# Patient Record
Sex: MALE | URBAN - METROPOLITAN AREA
[De-identification: names, ages, dates, MRNs, and addresses within clinical notes are randomized per-mention and may not be internally consistent; named-entity substitution may affect disease eponyms.]

---

## 2021-09-23 ENCOUNTER — NEW PATIENT (OUTPATIENT)
Dept: URBAN - METROPOLITAN AREA CLINIC 14 | Facility: CLINIC | Age: 71
End: 2021-09-23

## 2021-09-23 DIAGNOSIS — H43.813: ICD-10-CM

## 2021-09-23 PROCEDURE — 99203 OFFICE O/P NEW LOW 30 MIN: CPT

## 2021-09-23 PROCEDURE — 92134 CPTRZ OPH DX IMG PST SGM RTA: CPT

## 2021-09-23 PROCEDURE — 92202 OPSCPY EXTND ON/MAC DRAW: CPT

## 2021-09-23 ASSESSMENT — VISUAL ACUITY
OD_SC: 20/25
OS_SC: 20/20

## 2021-09-23 ASSESSMENT — TONOMETRY
OS_IOP_MMHG: 15
OD_IOP_MMHG: 13

## 2022-10-26 ENCOUNTER — HOSPITAL ENCOUNTER (EMERGENCY)
Facility: HOSPITAL | Age: 72
Discharge: HOME | End: 2022-10-26
Attending: EMERGENCY MEDICINE
Payer: COMMERCIAL

## 2022-10-26 VITALS
BODY MASS INDEX: 23.03 KG/M2 | HEART RATE: 58 BPM | RESPIRATION RATE: 14 BRPM | DIASTOLIC BLOOD PRESSURE: 64 MMHG | TEMPERATURE: 97.7 F | SYSTOLIC BLOOD PRESSURE: 130 MMHG | OXYGEN SATURATION: 99 % | HEIGHT: 72 IN | WEIGHT: 170 LBS

## 2022-10-26 DIAGNOSIS — I49.3 PVC (PREMATURE VENTRICULAR CONTRACTION): Primary | ICD-10-CM

## 2022-10-26 LAB
ANION GAP SERPL CALCULATED.3IONS-SCNC: 3 MMOL/L (ref 4–13)
ATRIAL RATE: 55 BPM
BUN SERPL-MCNC: 22 MG/DL (ref 5–25)
CALCIUM SERPL-MCNC: 10.2 MG/DL (ref 8.3–10.1)
CHLORIDE SERPL-SCNC: 109 MMOL/L (ref 96–108)
CO2 SERPL-SCNC: 29 MMOL/L (ref 21–32)
CREAT SERPL-MCNC: 1.04 MG/DL (ref 0.6–1.3)
GFR SERPL CREATININE-BSD FRML MDRD: 70 ML/MIN/1.73SQ M
GLUCOSE SERPL-MCNC: 99 MG/DL (ref 65–140)
P AXIS: 95 DEGREES
POTASSIUM SERPL-SCNC: 4.3 MMOL/L (ref 3.5–5.3)
PR INTERVAL: 130 MS
QRS AXIS: 28 DEGREES
QRSD INTERVAL: 102 MS
QT INTERVAL: 436 MS
QTC INTERVAL: 417 MS
SODIUM SERPL-SCNC: 141 MMOL/L (ref 135–147)
T WAVE AXIS: 60 DEGREES
VENTRICULAR RATE: 55 BPM

## 2022-10-26 PROCEDURE — 93005 ELECTROCARDIOGRAM TRACING: CPT

## 2022-10-26 PROCEDURE — 36415 COLL VENOUS BLD VENIPUNCTURE: CPT

## 2022-10-26 PROCEDURE — 93010 ELECTROCARDIOGRAM REPORT: CPT | Performed by: INTERNAL MEDICINE

## 2022-10-26 PROCEDURE — 80048 BASIC METABOLIC PNL TOTAL CA: CPT

## 2022-10-26 NOTE — ED ATTENDING ATTESTATION
10/26/2022  Henry HORNER DO, saw and evaluated the patient  I have discussed the patient with the resident/non-physician practitioner and agree with the resident's/non-physician practitioner's findings, Plan of Care, and MDM as documented in the resident's/non-physician practitioner's note, except where noted  All available labs and Radiology studies were reviewed  I was present for key portions of any procedure(s) performed by the resident/non-physician practitioner and I was immediately available to provide assistance  At this point I agree with the current assessment done in the Emergency Department  I have conducted an independent evaluation of this patient a history and physical is as follows:    Patient is a 65-year-old male history of previous PVCs diagnosed by Holter monitor, says about 11:00 a m  This morning he was driving in the car with his wife when he noticed feeling of palpitations which felt like his prior PVCs  Felt a little bit lightheaded with each PVC, no chest pain, no diaphoresis, no nausea, no vomiting  He stopped driving, took 1 of his wife's metoprolol tablets as he did not have his metoprolol with him, he does not know the exact dose but the wife believes it was the lowest dose, thinks it was 25 mg  Says after about 3 minutes of palpitations totally resolved and he now feels totally fine  Normally he has no exertional symptoms, no fever or chills  No orthopnea  Patient denies any prolonged travel history, no recent long travel, no immobilizations, or hospitalizations  Patient has an appoint with his cardiologist on Friday, 2 days from now      General:  Patient is well-appearing  Head:  Atraumatic  Eyes:  Conjunctiva pink  ENT:  Mucous membranes are moist  Neck:  Supple  Cardiac:  S1-S2, without murmurs  Lungs:  Clear to auscultation bilaterally  Abdomen:  Soft, nontender, normal bowel sounds, no CVA tenderness, no tympany, no rigidity, no guarding  Extremities:  Normal range of motion, no pedal edema or calf asymmetry, radial pulses are equal and symmetric bilaterally  Neurologic:  Awake, fluent speech, normal comprehension, AAOx3  Skin:  Pink warm and dry  Psychiatric:  Alert, pleasant, cooperative      ED Course     ECG interpreted me, sinus rhythm, rate of 55, no ST segment elevation or depression, no ischemic or infarct changes, no old available for comparison    Labs Reviewed   BASIC METABOLIC PANEL - Abnormal       Result Value Ref Range Status    Sodium 141  135 - 147 mmol/L Final    Potassium 4 3  3 5 - 5 3 mmol/L Final    Comment: Slightly Hemolyzed; Results May be Affected    Chloride 109 (*) 96 - 108 mmol/L Final    CO2 29  21 - 32 mmol/L Final    ANION GAP 3 (*) 4 - 13 mmol/L Final    BUN 22  5 - 25 mg/dL Final    Creatinine 1 04  0 60 - 1 30 mg/dL Final    Comment: Standardized to IDMS reference method    Glucose 99  65 - 140 mg/dL Final    Comment: If the patient is fasting, the ADA then defines impaired fasting glucose as > 100 mg/dL and diabetes as > or equal to 123 mg/dL  Specimen collection should occur prior to Sulfasalazine administration due to the potential for falsely depressed results  Specimen collection should occur prior to Sulfapyridine administration due to the potential for falsely elevated results      Calcium 10 2 (*) 8 3 - 10 1 mg/dL Final    eGFR 70  ml/min/1 73sq m Final    Narrative:     [place] guidelines for Chronic Kidney Disease (CKD):   •  Stage 1 with normal or high GFR (GFR > 90 mL/min/1 73 square meters)  •  Stage 2 Mild CKD (GFR = 60-89 mL/min/1 73 square meters)  •  Stage 3A Moderate CKD (GFR = 45-59 mL/min/1 73 square meters)  •  Stage 3B Moderate CKD (GFR = 30-44 mL/min/1 73 square meters)  •  Stage 4 Severe CKD (GFR = 15-29 mL/min/1 73 square meters)  •  Stage 5 End Stage CKD (GFR <15 mL/min/1 73 square meters)  Note: GFR calculation is accurate only with a steady state creatinine         On reassessment patient remains asymptomatic  At this point the patient most likely had isolated PVCs  Possibly could have been other sort of dysrhythmia however he is currently asymptomatic, history does not suggest ACS  Believe patient is stable for discharge and outpatient follow-up in 2 days with Cardiology as previously scheduled  Supportive care, importance of follow-up and return precautions were discussed with patient and wife, who expressed understanding        Critical Care Time  Procedures

## 2022-10-26 NOTE — ED PROVIDER NOTES
History  Chief Complaint   Patient presents with   • Palpitations     Pt has hx of PVCs, and started feeling palpitations in his chest while driving which he pulled over and took his wifes metoprolol ( unsure of dosage)      Patient 78-year-old male presenting for PVCs  Past medical history significant for Parkinson's and previous diagnosis of PVCs  Patient states he was driving to the [employer] approximately 30-40 minutes ago when he had a 2 minutes episode of palpitations  Patient felt dizzy/lightheaded during this episode and took his wife's metoprolol to treat the palpitations  At the time of the episode patient denies any chest pain, radiating pain down his arm or shortness of breath (any other symptoms)  Symptoms have since resolved  States he believes medication probably help  However due to concern still presented to the ER  At this time patient is entirely asymptomatic  Patient denies any chest pain, shortness of breath, nausea/vomiting  Patient denies any previous history of MI or heart surgery  None       Past Medical History:   Diagnosis Date   • Parkinson's disease ([facility] [address] )     mild   • PVC's (premature ventricular contractions)        History reviewed  No pertinent surgical history  History reviewed  No pertinent family history  I have reviewed and agree with the history as documented  E-Cigarette/Vaping     E-Cigarette/Vaping Substances     Social History     Tobacco Use   • Smoking status: Former Smoker     Quit date: 12/15/2000     Years since quittin 8   • Smokeless tobacco: Former User   Substance Use Topics   • Alcohol use: Not Currently     Alcohol/week: 1 0 standard drink     Types: 1 Cans of beer per week        Review of Systems   Constitutional: Negative  HENT: Negative  Eyes: Negative  Respiratory: Negative  Cardiovascular: Negative  Negative for chest pain and palpitations  At time a episode patient had palpitations    Currently patient has no palpitations  Gastrointestinal: Negative  Endocrine: Negative  Genitourinary: Negative  Musculoskeletal: Negative  Skin: Negative  Allergic/Immunologic: Negative  Neurological: Negative  Negative for dizziness and light-headedness  Patient currently asymptomatic  During episode patient states he was felt lightheaded, dizzy  Hematological: Negative  Psychiatric/Behavioral: Negative  Physical Exam  ED Triage Vitals [10/26/22 1229]   Temperature Pulse Respirations Blood Pressure SpO2   97 7 °F (36 5 °C) 57 16 (!) 174/77 98 %      Temp Source Heart Rate Source Patient Position - Orthostatic VS BP Location FiO2 (%)   Oral Monitor Lying Right arm --      Pain Score       No Pain             Orthostatic Vital Signs  Vitals:    10/26/22 1229 10/26/22 1315   BP: (!) 174/77 130/64   Pulse: 57 58   Patient Position - Orthostatic VS: Lying Lying       Physical Exam  Vitals and nursing note reviewed  Constitutional:       Appearance: Normal appearance  He is normal weight  HENT:      Head: Normocephalic and atraumatic  Right Ear: External ear normal       Left Ear: External ear normal       Nose: Nose normal       Mouth/Throat:      Mouth: Mucous membranes are moist       Pharynx: Oropharynx is clear  Eyes:      Extraocular Movements: Extraocular movements intact  Conjunctiva/sclera: Conjunctivae normal       Pupils: Pupils are equal, round, and reactive to light  Cardiovascular:      Rate and Rhythm: Normal rate and regular rhythm  Pulses: Normal pulses  Heart sounds: Normal heart sounds  Pulmonary:      Effort: Pulmonary effort is normal       Breath sounds: Normal breath sounds  Abdominal:      General: Abdomen is flat  Bowel sounds are normal       Palpations: Abdomen is soft  Musculoskeletal:         General: Normal range of motion  Cervical back: Normal range of motion and neck supple  Skin:     General: Skin is warm and dry        Capillary Refill: Capillary refill takes less than 2 seconds  Neurological:      Mental Status: He is alert and oriented to person, place, and time  Mental status is at baseline  Comments: Patient's history of Parkinson's  Baseline resting tremor  Psychiatric:         Mood and Affect: Mood normal          Behavior: Behavior normal          Thought Content:  Thought content normal          Judgment: Judgment normal          ED Medications  Medications - No data to display    Diagnostic Studies  Results Reviewed     Procedure Component Value Units Date/Time    Basic metabolic panel [[id number]]  (Abnormal) Collected: 10/26/22 1314    Lab Status: Final result Specimen: Blood from Arm, Left Updated: 10/26/22 1357     Sodium 141 mmol/L      Potassium 4 3 mmol/L      Chloride 109 mmol/L      CO2 29 mmol/L      ANION GAP 3 mmol/L      BUN 22 mg/dL      Creatinine 1 04 mg/dL      Glucose 99 mg/dL      Calcium 10 2 mg/dL      eGFR 70 ml/min/1 73sq m     Narrative:      [place] guidelines for Chronic Kidney Disease (CKD):   •  Stage 1 with normal or high GFR (GFR > 90 mL/min/1 73 square meters)  •  Stage 2 Mild CKD (GFR = 60-89 mL/min/1 73 square meters)  •  Stage 3A Moderate CKD (GFR = 45-59 mL/min/1 73 square meters)  •  Stage 3B Moderate CKD (GFR = 30-44 mL/min/1 73 square meters)  •  Stage 4 Severe CKD (GFR = 15-29 mL/min/1 73 square meters)  •  Stage 5 End Stage CKD (GFR <15 mL/min/1 73 square meters)  Note: GFR calculation is accurate only with a steady state creatinine                 No orders to display         Procedures  ECG 12 Lead Documentation Only    Date/Time: 10/26/2022 1:50 PM  Performed by: Casi Castano MD  Authorized by: Casi Castano MD     Indications / Diagnosis:  PVC's  ECG reviewed by me, the ED Provider: yes    Patient location:  ED  Previous ECG:     Previous ECG:  Unavailable    Comparison to cardiac monitor: Yes    Interpretation:     Interpretation: normal    Rate:     ECG rate:  55    ECG rate assessment: bradycardic    Rhythm:     Rhythm: sinus rhythm    Ectopy:     Ectopy: none    QRS:     QRS axis:  Normal  Conduction:     Conduction: normal    ST segments:     ST segments:  Normal  T waves:     T waves: normal            ED Course               Identification of Seniors at Risk    Flowsheet Row Most Recent Value   (ISAR) Identification of Seniors at Risk    Before the illness or injury that brought you to the Emergency, did you need someone to help you on a regular basis? 0 Filed at: 10/26/2022 1217   In the last 24 hours, have you needed more help than usual? 0 Filed at: 10/26/2022 1217   Have you been hospitalized for one or more nights during the past 6 months? 0 Filed at: 10/26/2022 1217   In general, do you see well? 0 Filed at: 10/26/2022 1217   In general, do you have serious problems with your memory? 0 Filed at: 10/26/2022 1217   Do you take more than three different medications every day? 1 Filed at: 10/26/2022 1217   ISAR Score 1 Filed at: 10/26/2022 1217                              MDM  Number of Diagnoses or Management Options  PVC (premature ventricular contraction): established and improving  Diagnosis management comments: Patient 76-year-old male presenting for PVCs  DDx: PVC's/arrythmia, ACS   Given previous history of PVCs, will obtain ecg  Concern for arrhythmia secondary to electrolyte abnormality, will obtain BMP  Pt already has appointment scheduled for Friday with cardiologist  Plan to have pt followup at this appointment  Given presentation and no significant cardiac history aside from PVCs, low concern for ACS at this time         Amount and/or Complexity of Data Reviewed  Clinical lab tests: ordered and reviewed  Discussion of test results with the performing providers: yes  Review and summarize past medical records: yes  Independent visualization of images, tracings, or specimens: yes    Risk of Complications, Morbidity, and/or Mortality  Presenting problems: low  Diagnostic procedures: low  Management options: low    Patient Progress  Patient progress: stable      Disposition  Final diagnoses:   PVC (premature ventricular contraction)     Time reflects when diagnosis was documented in both MDM as applicable and the Disposition within this note     Time User Action Codes Description Comment    10/26/2022  2:27 PM Eduardo Rdz Add [I49 3] PVC (premature ventricular contraction)       ED Disposition     ED Disposition   Discharge    Condition   Stable    Date/Time   Wed Oct 26, 2022  2:27 PM    Comment   Cooper Javed discharge to home/self care  Follow-up Information     Follow up With Specialties Details Why Contact Info Additional [address] Emergency Department Emergency Medicine Go to  If symptoms worsen [address] [id number]  [address] Emergency Department, [address] [place], [place], [address] [phone]          There are no discharge medications for this patient  No discharge procedures on file  PDMP Review     None           ED Provider  Attending physically available and evaluated Cooper Javed I managed the patient along with the ED Attending      Electronically Signed by         Vivek Black MD  10/26/22 9101       Vivek Black MD  10/26/22 2227

## 2022-10-26 NOTE — DISCHARGE INSTRUCTIONS
Please follow up with your Cardiology appointment on Friday and let him know about your recent visit to the ER   If any recurrence of symptoms please return to the ER